# Patient Record
Sex: MALE | Race: WHITE | NOT HISPANIC OR LATINO | Employment: OTHER | URBAN - METROPOLITAN AREA
[De-identification: names, ages, dates, MRNs, and addresses within clinical notes are randomized per-mention and may not be internally consistent; named-entity substitution may affect disease eponyms.]

---

## 2024-01-29 ENCOUNTER — OFFICE VISIT (OUTPATIENT)
Dept: SURGERY | Facility: CLINIC | Age: 69
End: 2024-01-29

## 2024-01-29 VITALS
RESPIRATION RATE: 16 BRPM | HEART RATE: 67 BPM | SYSTOLIC BLOOD PRESSURE: 114 MMHG | WEIGHT: 156 LBS | BODY MASS INDEX: 22.33 KG/M2 | DIASTOLIC BLOOD PRESSURE: 70 MMHG | HEIGHT: 70 IN | OXYGEN SATURATION: 98 %

## 2024-01-29 DIAGNOSIS — K40.90 LEFT INGUINAL HERNIA: Primary | ICD-10-CM

## 2024-01-29 PROCEDURE — 99205 OFFICE O/P NEW HI 60 MIN: CPT | Performed by: SURGERY

## 2024-02-01 NOTE — PROGRESS NOTES
General surgery clinic note    Had the pleasure to meet with Mr. Lu to discuss care of her recently discovered left inguinal hernia.  He has a history of right inguinal hernia repair and has had no issues with that since.  He believes that in March 2023 while shoveling some snow from his driveway he first felt some pain and discomfort on his left groin and subsequently developed a bulge.  This bulge has remained reducible.  He has not experienced any gastrointestinal problems.  No apparent incarceration.    On exam his right groin feels intact, there is a well-healed scar.  His left groin shows a reducible left inguinal hernia.    Left inguinal hernia.  After discussing the different approaches between open and laparoscopic repair with mesh we will do so at his convenience.    Total encounter time and understanding the risk, benefits, hopeful outcomes, possible implications of each, he would like to proceed with open repair under sedation.    60 minutes, more than half spent in counseling, review of data, and coordination of care.

## 2024-02-01 NOTE — PATIENT INSTRUCTIONS
Request submitted to business office for cost estimate.    Patient to call back when ready to proceed with surgery.